# Patient Record
Sex: FEMALE | Race: WHITE | Employment: FULL TIME | URBAN - NONMETROPOLITAN AREA
[De-identification: names, ages, dates, MRNs, and addresses within clinical notes are randomized per-mention and may not be internally consistent; named-entity substitution may affect disease eponyms.]

---

## 2021-11-05 ENCOUNTER — HOSPITAL ENCOUNTER (EMERGENCY)
Age: 36
Discharge: HOME | End: 2021-11-05
Attending: EMERGENCY MEDICINE

## 2021-11-05 VITALS
HEART RATE: 80 BPM | SYSTOLIC BLOOD PRESSURE: 194 MMHG | RESPIRATION RATE: 16 BRPM | DIASTOLIC BLOOD PRESSURE: 116 MMHG | TEMPERATURE: 98.4 F | HEIGHT: 72 IN | WEIGHT: 170 LBS | BODY MASS INDEX: 23.03 KG/M2 | OXYGEN SATURATION: 100 %

## 2021-11-05 DIAGNOSIS — R03.0 ELEVATED BLOOD PRESSURE READING: ICD-10-CM

## 2021-11-05 DIAGNOSIS — L72.3 SEBACEOUS CYST: Primary | ICD-10-CM

## 2021-11-05 DIAGNOSIS — L03.312 CELLULITIS OF BACK EXCEPT BUTTOCK: ICD-10-CM

## 2021-11-05 PROCEDURE — 99283 EMERGENCY DEPT VISIT LOW MDM: CPT

## 2021-11-05 PROCEDURE — U0003 INFECTIOUS AGENT DETECTION BY NUCLEIC ACID (DNA OR RNA); SEVERE ACUTE RESPIRATORY SYNDROME CORONAVIRUS 2 (SARS-COV-2) (CORONAVIRUS DISEASE [COVID-19]), AMPLIFIED PROBE TECHNIQUE, MAKING USE OF HIGH THROUGHPUT TECHNOLOGIES AS DESCRIBED BY CMS-2020-01-R: HCPCS

## 2021-11-05 PROCEDURE — U0005 INFEC AGEN DETEC AMPLI PROBE: HCPCS

## 2021-11-05 RX ORDER — DOXYCYCLINE HYCLATE 100 MG
100 TABLET ORAL 2 TIMES DAILY
Qty: 20 TABLET | Refills: 0 | Status: SHIPPED | OUTPATIENT
Start: 2021-11-05 | End: 2021-11-15

## 2021-11-05 ASSESSMENT — PAIN DESCRIPTION - PAIN TYPE: TYPE: ACUTE PAIN

## 2021-11-05 ASSESSMENT — PAIN SCALES - GENERAL: PAINLEVEL_OUTOF10: 6

## 2021-11-05 NOTE — ED NOTES
Pt discharge instructions, follow up and rx x 1 reviewed with pt. Pt verbalized understanding. No further needs. Pt discharged at this time.         Arnulfo Lambert RN  11/05/21 2395

## 2021-11-05 NOTE — LETTER
[address] [facility] Emergency Department  [facility] [address]  Phone: [phone]             November 5, 2021    Patient: Gumaro Guerra   YOB: 1985   Date of Visit: 11/5/2021       To Whom It May Concern:    Paxton Guzmán was seen and treated in our emergency department on 11/5/2021. She may return to work on 11/8/21.       Sincerely,             Signature:__________________________________

## 2021-11-05 NOTE — ED PROVIDER NOTES
[facility]      CHIEF COMPLAINT  Abscess (Pt has an abcess on her back L shoulder that she states gave her a \"fever of 88. \" Also states that it has caused diarrhea)       HISTORY OF PRESENT ILLNESS  Jaquan Allen is a 29 y.o. female  who presents to the ED complaining of possible abscess to her back. Was soft at first, now is more hard. Usually has temp of 96F but has been a \"fever\" of 98F.  + diarrhea x1. No melena, hematochezia or mucus in stool. Has had abscess in similar area s/p I&D.  + chills. No cp/dyspnea, or palpitations. No urinary sxs. No known sick contacts. No other complaints, modifying factors or associated symptoms. I have reviewed the following from the nursing documentation. Past Medical History:   Diagnosis Date    Asthma     Neutropenia, unspecified ([facility] [address] 11/1/2007     Past Surgical History:   Procedure Laterality Date    BONE MARROW BIOPSY      CHOLECYSTECTOMY       History reviewed. No pertinent family history. Social History     Socioeconomic History    Marital status: Single     Spouse name: Not on file    Number of children: Not on file    Years of education: Not on file    Highest education level: Not on file   Occupational History    Not on file   Tobacco Use    Smoking status: Never Smoker    Smokeless tobacco: Never Used   Substance and Sexual Activity    Alcohol use: Not on file    Drug use: Not on file    Sexual activity: Not on file   Other Topics Concern    Not on file   Social History Narrative    Not on file     Social Determinants of Health     Financial Resource Strain:     Difficulty of Paying Living Expenses:    Food Insecurity:     Worried About Running Out of Food in the Last Year:     [address] in the Last Year:    Transportation Needs:     Lack of Transportation (Medical):      Lack of Transportation (Non-Medical):    Physical Activity:     Days of Exercise per Week:     Minutes of Exercise per Session: Stress:     Feeling of Stress :    Social Connections:     Frequency of Communication with Friends and Family:     Frequency of Social Gatherings with Friends and Family:     Attends Confucianist Services:     Active Member of Clubs or Organizations:     Attends Club or Organization Meetings:     Marital Status:    Intimate Partner Violence:     Fear of Current or Ex-Partner:     Emotionally Abused:     Physically Abused:     Sexually Abused:      No current facility-administered medications for this encounter. Current Outpatient Medications   Medication Sig Dispense Refill    doxycycline hyclate (VIBRA-TABS) 100 MG tablet Take 1 tablet by mouth 2 times daily for 10 days 20 tablet 0    etonogestrel (IMPLANON) 68 MG implant Inject 68 mg into the skin once.  albuterol (PROVENTIL) (2.5 MG/3ML) 0.083% nebulizer solution Take  by nebulization every 6 hours as needed for Wheezing. Allergies   Allergen Reactions    Albuterol      IS NOT ALLERGIC    Codeine Itching       REVIEW OF SYSTEMS  10 systems reviewed, pertinent positives per HPI otherwise noted to be negative. PHYSICAL EXAM  BP (!) 200/125   Pulse 80   Temp 98.4 °F (36.9 °C) (Oral)   Resp 16   Ht 6' (1.829 m)   Wt 170 lb (77.1 kg)   SpO2 100%   BMI 23.06 kg/m²    GENERAL APPEARANCE: Awake and alert. Cooperative. No acute distress. HENT: Normocephalic. Atraumatic. Mucous membranes are moist.  No drooling or stridor. NECK: Supple. EYES: PERRL. EOM's grossly intact. HEART/CHEST: RRR. No murmurs. LUNGS: Respirations unlabored. CTAB. Good air exchange. Speaking comfortably in full sentences. ABDOMEN: No tenderness. Soft. Non-distended. No masses. No organomegaly. No guarding or rebound. MUSCULOSKELETAL: No extremity edema. Compartments soft. No deformity. No tenderness in the extremities. All extremities neurovascularly intact. SKIN: Warm and dry.   Patient with cyst measuring approximately 3 x 2 cm to the left upper back with very slight faint overlying erythema. No active drainage. Incision scar from previous I&D/excision noted just above that. NEUROLOGICAL: Alert and oriented. CN's 2-12 intact. No gross facial drooping. No gross focal deficits. PSYCHIATRIC: Normal mood and affect. LABS  I have reviewed all labs for this visit. No results found for this visit on 11/05/21. RADIOLOGY  None     ED COURSE/MDM  Patient seen and evaluated. Old records reviewed. Patient presenting for evaluation of possible cyst or abscess to the left upper back noted to have more of a cystic type structure that is well demarcated rather than a fluctuant abscess. There is very faint erythema and I will put her on a course of oral doxycycline given her chills and subjective fevers although no fever over 100.4 Fahrenheit. Patient in agreement of that plan. She was requesting a Covid swab for her work but does not have any Covid symptoms. This is sent and pending. She is to quarantine per [place] guidelines if positive. She is noted to be markedly hypertensive here but no evidence of endorgan damage based on symptoms or exam but I will have her follow-up very closely for recheck of this. She indicated the nurse that this is typical for her whenever she is in the ER for her to have elevated blood pressure such as this but either way she will need very close follow-up for this blood pressure. Reasons to return to the ER discussed and all questions answered at time of discharge. I estimate there is LOW risk for ACUTE CORONARY SYNDROME, INTRACRANIAL HEMORRHAGE, MALIGNANT DYSRHYTHMIA or HYPERTENSION, PULMONARY EMBOLISM, SEPSIS, SUBARACHNOID HEMORRHAGE, SUBDURAL HEMATOMA, STROKE, or THORACIC AORTIC DISSECTION, thus I consider the discharge disposition reasonable. Sybil Galicia and I have discussed the diagnosis and risks, and we agree with discharging home to follow-up with their primary doctor.  We also discussed returning to the Emergency Department immediately if new or worsening symptoms occur. We have discussed the symptoms which are most concerning (e.g., bloody sputum, fever, worsening pain or shortness of breath, vomiting, weakness) that necessitate immediate return. During the patient's ED course, the patient was given:  Medications - No data to display     CLINICAL IMPRESSION  1. Sebaceous cyst    2. Cellulitis of back except buttock    3. Elevated blood pressure reading        Blood pressure (!) 200/125, pulse 80, temperature 98.4 °F (36.9 °C), temperature source Oral, resp. rate 16, height 6' (1.829 m), weight 170 lb (77.1 kg), SpO2 100 %. DISPOSITION  Sybil Galicia was discharged to home in stable condition. Patient was given scripts for the following medications. I counseled patient how to take these medications. New Prescriptions    DOXYCYCLINE HYCLATE (VIBRA-TABS) 100 MG TABLET    Take 1 tablet by mouth 2 times daily for 10 days       Follow-up with:  [address] [facility]  Call today or tomorrow at [phone] to make an appointment with general surgery to follow-up  Call in 1 day      [facility]) Pre-Services  [phone]  Schedule an appointment as soon as possible for a visit in 2 days  For recheck, To establish care with a primary care physician      DISCLAIMER: This chart was created using Dragon dictation software. Efforts were made by me to ensure accuracy, however some errors may be present due to limitations of this technology and occasionally words are not transcribed correctly.         Edmundo Noble MD  11/05/21 8188

## 2021-11-06 LAB — SARS-COV-2: NOT DETECTED
